# Patient Record
Sex: FEMALE | Race: WHITE
[De-identification: names, ages, dates, MRNs, and addresses within clinical notes are randomized per-mention and may not be internally consistent; named-entity substitution may affect disease eponyms.]

---

## 2017-08-08 ENCOUNTER — HOSPITAL ENCOUNTER (OUTPATIENT)
Dept: HOSPITAL 45 - C.MAMM | Age: 67
Discharge: HOME | End: 2017-08-08
Attending: OBSTETRICS & GYNECOLOGY
Payer: COMMERCIAL

## 2017-08-08 DIAGNOSIS — Z12.31: Primary | ICD-10-CM

## 2017-08-09 NOTE — MAMMOGRAPHY REPORT
BILATERAL DIGITAL SCREENING MAMMOGRAM WITH CAD: 8/8/2017

CLINICAL HISTORY: Routine screening.  Patient has no complaints.  





TECHNIQUE: Bilateral CC, MLO and repeat right cc views were obtained.  Current study was also evaluat
ed with a Computer Aided Detection (CAD) system.  



COMPARISON: Comparison is made to exams dated:  8/5/2016 mammogram, 8/4/2015 mammogram, 8/1/2014 mamm
ogram, 7/3/2013 mammogram, 6/28/2012 mammogram, and 6/21/2011 mammogram - [facility]   



BREAST COMPOSITION:  The tissue of both breasts is almost entirely fatty.  



FINDINGS: There is stable focal asymmetry in the upper outer anterior right breast.  Scattered benign
-appearing punctate microcalcifications.  No suspicious mass, architectural distortion or cluster of 
microcalcifications is seen.  



IMPRESSION:  ACR BI-RADS CATEGORY 1: NEGATIVE

There is no mammographic evidence of malignancy. A 1 year screening mammogram is recommended.  The pa
tient will receive written notification of the results.  





Approximately 10% of breast cancers are not detected with mammography. A negative mammographic report
 should not delay biopsy if a clinically suggestive mass is present.



Raine Barba M.D.          

ay/:8/8/2017 15:30:24  



Imaging Technologist: Mitra Teresa, [facility]

letter sent: Normal 1/2  

BI-RADS Code: ACR BI-RADS Category 1: Negative

## 2017-09-06 ENCOUNTER — HOSPITAL ENCOUNTER (OUTPATIENT)
Dept: HOSPITAL 45 - C.LABBC | Age: 67
Discharge: HOME | End: 2017-09-06
Attending: INTERNAL MEDICINE
Payer: COMMERCIAL

## 2017-09-06 DIAGNOSIS — E55.9: Primary | ICD-10-CM

## 2017-09-06 DIAGNOSIS — R73.01: ICD-10-CM

## 2017-09-06 LAB
ALBUMIN/GLOB SERPL: 1 {RATIO} (ref 0.9–2)
ALP SERPL-CCNC: 92 U/L (ref 45–117)
ALT SERPL-CCNC: 22 U/L (ref 12–78)
ANION GAP SERPL CALC-SCNC: 7 MMOL/L (ref 3–11)
AST SERPL-CCNC: 15 U/L (ref 15–37)
BASOPHILS # BLD: 0.02 K/UL (ref 0–0.2)
BASOPHILS NFR BLD: 0.4 %
BUN SERPL-MCNC: 12 MG/DL (ref 7–18)
BUN/CREAT SERPL: 13.3 (ref 10–20)
CALCIUM SERPL-MCNC: 9.5 MG/DL (ref 8.5–10.1)
CHLORIDE SERPL-SCNC: 107 MMOL/L (ref 98–107)
CHOLEST/HDLC SERPL: 4.2 {RATIO}
CO2 SERPL-SCNC: 28 MMOL/L (ref 21–32)
COMPLETE: YES
CREAT SERPL-MCNC: 0.92 MG/DL (ref 0.6–1.2)
EOSINOPHIL NFR BLD AUTO: 175 K/UL (ref 130–400)
EST. AVERAGE GLUCOSE BLD GHB EST-MCNC: 105 MG/DL
GLOBULIN SER-MCNC: 3.6 GM/DL (ref 2.5–4)
GLUCOSE SERPL-MCNC: 104 MG/DL (ref 70–99)
GLUCOSE UR QL: 50 MG/DL
HCT VFR BLD CALC: 39.1 % (ref 37–47)
IG%: 0.2 %
IMM GRANULOCYTES NFR BLD AUTO: 37.5 %
KETONES UR QL STRIP: 120 MG/DL
LYMPHOCYTES # BLD: 2.13 K/UL (ref 1.2–3.4)
MCH RBC QN AUTO: 27.1 PG (ref 25–34)
MCHC RBC AUTO-ENTMCNC: 33 G/DL (ref 32–36)
MCV RBC AUTO: 82.1 FL (ref 80–100)
MONOCYTES NFR BLD: 6.9 %
NEUTROPHILS # BLD AUTO: 1.8 %
NEUTROPHILS NFR BLD AUTO: 53.2 %
NITRITE UR QL STRIP: 189 MG/DL (ref 0–150)
PH UR: 208 MG/DL (ref 0–200)
PMV BLD AUTO: 9.8 FL (ref 7.4–10.4)
POTASSIUM SERPL-SCNC: 3.9 MMOL/L (ref 3.5–5.1)
RBC # BLD AUTO: 4.76 M/UL (ref 4.2–5.4)
SODIUM SERPL-SCNC: 142 MMOL/L (ref 136–145)
TSH SERPL-ACNC: 3.53 UIU/ML (ref 0.3–4.5)
VERY LOW DENSITY LIPOPROT CALC: 38 MG/DL
WBC # BLD AUTO: 5.68 K/UL (ref 4.8–10.8)

## 2017-09-26 ENCOUNTER — HOSPITAL ENCOUNTER (OUTPATIENT)
Dept: HOSPITAL 45 - C.RADBC | Age: 67
Discharge: HOME | End: 2017-09-26
Attending: INTERNAL MEDICINE
Payer: COMMERCIAL

## 2017-09-26 DIAGNOSIS — M17.12: Primary | ICD-10-CM

## 2017-09-26 NOTE — DIAGNOSTIC IMAGING REPORT
L KNEE 3 VIEWS



HISTORY:  67 years-old Female Osteoarthritis of left kneeleft acute left knee

pain for 2 months. Initial exam.



COMPARISON: None available.



TECHNIQUE: 3 views of the left knee.



FINDINGS: 

Bones are mildly demineralized. Mild tricompartmental osteoarthritis is noted,

most pronounced in the medial and patellofemoral compartments. There is spurring

of the superior patella at the quadriceps insertion site. There is a small joint

effusion. No acute fracture or dislocation.



IMPRESSION: 

1. No acute fracture or dislocation.

2. Small joint effusion with mild tricompartmental osteoarthritis. 







The above report was generated using voice recognition software. It may contain

grammatical, syntax or spelling errors.







Electronically signed by:  Jose Carlos Morelos M.D.

9/26/2017 12:18 PM



Dictated Date/Time:  9/26/2017 12:17 PM

## 2018-08-10 ENCOUNTER — HOSPITAL ENCOUNTER (OUTPATIENT)
Dept: HOSPITAL 45 - C.MAMM | Age: 68
Discharge: HOME | End: 2018-08-10
Attending: OBSTETRICS & GYNECOLOGY
Payer: COMMERCIAL

## 2018-08-10 DIAGNOSIS — Z12.31: Primary | ICD-10-CM

## 2018-08-10 NOTE — MAMMOGRAPHY REPORT
BILATERAL DIGITAL SCREENING MAMMOGRAM TOMOSYNTHESIS WITH CAD: 8/10/2018

CLINICAL HISTORY: Routine screening. Patient has no complaints.  





TECHNIQUE: The study was acquired using full field digital technology and interpreted from soft copy.
 Breast tomosynthesis in addition to standard 2D mammography was performed. Current study was also ev
aluated with a Computer Aided Detection (CAD) system.  



COMPARISON: Comparison is made to exams dated:  8/8/2017 mammogram, 8/5/2016 mammogram, 8/4/2015 mamm
ogram, 7/3/2013 mammogram, and 6/21/2011 mammogram - [facility].   

BREAST COMPOSITION: The tissue of both breasts is almost entirely fatty.  



FINDINGS: 

No suspicious masses, calcifications, or areas of architectural distortion are noted in either breast
. There has been no significant interval change compared to prior exams. Scattered bilateral benign-a
ppearing calcifications are not significantly changed.  Asymmetry in the right superior anterior navya
st is stable compared to prior exams.





IMPRESSION: ACR BI-RADS CATEGORY 2: BENIGN

There is no mammographic evidence of malignancy. A 1 year screening mammogram is recommended.(08/11/2
019)  The patient will receive written notification of the results.  





Some breast cancers are not detected with mammography. A negative mammographic report should not ravi
y biopsy if a clinically suggestive mass is present.



Dee Uribe M.D.          

/:8/10/2018 14:22:53  



Imaging Technologist: RT Evan(BASIA)(M), [facility]

letter sent: Normal 1/2  

BI-RADS Code: ACR BI-RADS Category 2: Benign

## 2020-02-01 NOTE — CODING QUERY MEDICAL NECESSITY
PT on RA, no respiratory distress noted. Will continue to monitor.     SUPPORTING DIAGNOSIS NEEDED



Dr. Richard,



A supporting diagnosis is required for the test/procedure performed on this patient in 
order for us to be reimbursed by the patient's insurance. Please provide a supporting 
diagnosis for the following test/procedure listed below next to the test name along with 
your signature. 



*If there is no additional diagnosis for this patient that would support the following 
test/procedure please document that below next to the test/procedure.



Test(s)/Procedure(s) that require a supporting diagnosis:





* [address]70 GLYCATED HEMOGLOBIN            DIAGNOSIS:





***DATE OF SERVICE: 9/6/17***





Provider Signature:  ______________________________  Date:  _______



Thank you  

Didier Joshi

[facility] Information Management

Phone:  [phone]

Fax:  [phone]



Once completed, please kindly fax back to [phone]



For questions please call [phone]